# Patient Record
Sex: FEMALE | Race: WHITE | NOT HISPANIC OR LATINO | URBAN - METROPOLITAN AREA
[De-identification: names, ages, dates, MRNs, and addresses within clinical notes are randomized per-mention and may not be internally consistent; named-entity substitution may affect disease eponyms.]

---

## 2022-09-22 ENCOUNTER — INPATIENT (INPATIENT)
Facility: HOSPITAL | Age: 72
LOS: 1 days | Discharge: HOME | DRG: 287 | End: 2022-09-24
Attending: INTERNAL MEDICINE | Admitting: INTERNAL MEDICINE
Payer: MEDICARE

## 2022-09-22 VITALS — HEART RATE: 87 BPM | TEMPERATURE: 98 F

## 2022-09-22 LAB
ALBUMIN SERPL ELPH-MCNC: 4.1 G/DL — SIGNIFICANT CHANGE UP (ref 3.3–5)
ALP SERPL-CCNC: 115 U/L — SIGNIFICANT CHANGE UP (ref 40–120)
ALT FLD-CCNC: 12 U/L — SIGNIFICANT CHANGE UP (ref 10–45)
ANION GAP SERPL CALC-SCNC: 13 MMOL/L — SIGNIFICANT CHANGE UP (ref 5–17)
AST SERPL-CCNC: 22 U/L — SIGNIFICANT CHANGE UP (ref 10–40)
BASOPHILS # BLD AUTO: 0.05 K/UL — SIGNIFICANT CHANGE UP (ref 0–0.2)
BASOPHILS NFR BLD AUTO: 0.6 % — SIGNIFICANT CHANGE UP (ref 0–2)
BILIRUB SERPL-MCNC: 0.4 MG/DL — SIGNIFICANT CHANGE UP (ref 0.2–1.2)
BUN SERPL-MCNC: 12 MG/DL — SIGNIFICANT CHANGE UP (ref 7–23)
CALCIUM SERPL-MCNC: 9.5 MG/DL — SIGNIFICANT CHANGE UP (ref 8.4–10.5)
CHLORIDE SERPL-SCNC: 104 MMOL/L — SIGNIFICANT CHANGE UP (ref 96–108)
CK MB CFR SERPL CALC: 2.1 NG/ML — SIGNIFICANT CHANGE UP (ref 0–6.7)
CK SERPL-CCNC: 44 U/L — SIGNIFICANT CHANGE UP (ref 25–170)
CO2 SERPL-SCNC: 25 MMOL/L — SIGNIFICANT CHANGE UP (ref 22–31)
CREAT SERPL-MCNC: 0.78 MG/DL — SIGNIFICANT CHANGE UP (ref 0.5–1.3)
EGFR: 81 ML/MIN/1.73M2 — SIGNIFICANT CHANGE UP
EOSINOPHIL # BLD AUTO: 0.08 K/UL — SIGNIFICANT CHANGE UP (ref 0–0.5)
EOSINOPHIL NFR BLD AUTO: 0.9 % — SIGNIFICANT CHANGE UP (ref 0–6)
GLUCOSE SERPL-MCNC: 113 MG/DL — HIGH (ref 70–99)
HCT VFR BLD CALC: 40.6 % — SIGNIFICANT CHANGE UP (ref 34.5–45)
HGB BLD-MCNC: 13.7 G/DL — SIGNIFICANT CHANGE UP (ref 11.5–15.5)
IMM GRANULOCYTES NFR BLD AUTO: 0.3 % — SIGNIFICANT CHANGE UP (ref 0–0.9)
LYMPHOCYTES # BLD AUTO: 2.51 K/UL — SIGNIFICANT CHANGE UP (ref 1–3.3)
LYMPHOCYTES # BLD AUTO: 29 % — SIGNIFICANT CHANGE UP (ref 13–44)
MCHC RBC-ENTMCNC: 31.4 PG — SIGNIFICANT CHANGE UP (ref 27–34)
MCHC RBC-ENTMCNC: 33.7 GM/DL — SIGNIFICANT CHANGE UP (ref 32–36)
MCV RBC AUTO: 93.1 FL — SIGNIFICANT CHANGE UP (ref 80–100)
MONOCYTES # BLD AUTO: 0.61 K/UL — SIGNIFICANT CHANGE UP (ref 0–0.9)
MONOCYTES NFR BLD AUTO: 7 % — SIGNIFICANT CHANGE UP (ref 2–14)
NEUTROPHILS # BLD AUTO: 5.38 K/UL — SIGNIFICANT CHANGE UP (ref 1.8–7.4)
NEUTROPHILS NFR BLD AUTO: 62.2 % — SIGNIFICANT CHANGE UP (ref 43–77)
NRBC # BLD: 0 /100 WBCS — SIGNIFICANT CHANGE UP (ref 0–0)
NT-PROBNP SERPL-SCNC: 1711 PG/ML — HIGH (ref 0–300)
PLATELET # BLD AUTO: 276 K/UL — SIGNIFICANT CHANGE UP (ref 150–400)
POTASSIUM SERPL-MCNC: 4.2 MMOL/L — SIGNIFICANT CHANGE UP (ref 3.5–5.3)
POTASSIUM SERPL-SCNC: 4.2 MMOL/L — SIGNIFICANT CHANGE UP (ref 3.5–5.3)
PROT SERPL-MCNC: 6.9 G/DL — SIGNIFICANT CHANGE UP (ref 6–8.3)
RBC # BLD: 4.36 M/UL — SIGNIFICANT CHANGE UP (ref 3.8–5.2)
RBC # FLD: 13.2 % — SIGNIFICANT CHANGE UP (ref 10.3–14.5)
SARS-COV-2 RNA SPEC QL NAA+PROBE: NEGATIVE — SIGNIFICANT CHANGE UP
SODIUM SERPL-SCNC: 142 MMOL/L — SIGNIFICANT CHANGE UP (ref 135–145)
TROPONIN T SERPL-MCNC: 0.03 NG/ML — HIGH (ref 0–0.01)
WBC # BLD: 8.66 K/UL — SIGNIFICANT CHANGE UP (ref 3.8–10.5)
WBC # FLD AUTO: 8.66 K/UL — SIGNIFICANT CHANGE UP (ref 3.8–10.5)

## 2022-09-22 PROCEDURE — 71045 X-RAY EXAM CHEST 1 VIEW: CPT | Mod: 26

## 2022-09-22 PROCEDURE — 93010 ELECTROCARDIOGRAM REPORT: CPT

## 2022-09-22 PROCEDURE — 99291 CRITICAL CARE FIRST HOUR: CPT

## 2022-09-22 RX ORDER — TICAGRELOR 90 MG/1
180 TABLET ORAL ONCE
Refills: 0 | Status: COMPLETED | OUTPATIENT
Start: 2022-09-22 | End: 2022-09-22

## 2022-09-22 RX ORDER — ASPIRIN/CALCIUM CARB/MAGNESIUM 324 MG
1 TABLET ORAL
Qty: 0 | Refills: 0 | DISCHARGE

## 2022-09-22 RX ORDER — ALLOPURINOL 300 MG
150 TABLET ORAL
Qty: 0 | Refills: 0 | DISCHARGE

## 2022-09-22 RX ORDER — ATORVASTATIN CALCIUM 80 MG/1
80 TABLET, FILM COATED ORAL ONCE
Refills: 0 | Status: COMPLETED | OUTPATIENT
Start: 2022-09-22 | End: 2022-09-22

## 2022-09-22 RX ADMIN — TICAGRELOR 180 MILLIGRAM(S): 90 TABLET ORAL at 21:08

## 2022-09-22 RX ADMIN — ATORVASTATIN CALCIUM 80 MILLIGRAM(S): 80 TABLET, FILM COATED ORAL at 21:08

## 2022-09-22 NOTE — ED PROVIDER NOTE - OBJECTIVE STATEMENT
73 yo female with a hx of HTN, TBI w residual vestibular syndrome, cataracts sent to the ED by her PCP for an abnormal stress test. The patient had a normal echo and EKG on Friday according to her- was getting it done as screening. Two days ago, she developed dyspnea on exertion and burning upper b/l anterior chest pain radiating to her neck. She went to her PMD/cardiologist today who performed a stress test. It was positive due to dyspnea and abnormal tracing. Her PCP called EMS and gave her 325mg aspirin. Pt currently denies active chest pain or SOB. Denies fevers, chills, URI symptoms, leg swelling/pain.

## 2022-09-22 NOTE — H&P ADULT - PROBLEM SELECTOR PLAN 2
pt w/ Hx of UTI symptoms in the setting of hyperurecemia in urine, improved on allopurinol  -continue allopurinol 150mg qd pt w/ Hx of UTI symptoms in the setting of hyperurecemia in urine, improved on allopurinol  -continue allopurinol 150mg qd    Dispo pending cath

## 2022-09-22 NOTE — ED PROVIDER NOTE - CLINICAL SUMMARY MEDICAL DECISION MAKING FREE TEXT BOX
73 yo female with a hx of HTN, TBI w residual vestibular syndrome, cataracts sent to the ED by her PCP for an abnormal stress test. No active chest pain. S/p ASA. EKG with KAZ in aVL with ST depression in II, III, aVF, V3-6. ACS work up. No clinical pneumonia or concern for PE at this time.

## 2022-09-22 NOTE — H&P ADULT - PROBLEM SELECTOR PLAN 1
currently w/ band like CP  - s/p ASA 81x 4 in Dr. Woods office, loaded with Brilinta 180mg in ED, Held AM dose, would consider switching to plavix for reduced bleeding risk, patient has vestibular dysfunction/vertigo w/ unsteady gait and risk for falls,   - continue Atorvastatin 80mg and propranolol currently w/ band like CP  - s/p ASA 81x 4 in Dr. Woods office, loaded with Brilinta 180mg in ED, Held AM dose, would consider switching to plavix for reduced bleeding risk, patient has vestibular dysfunction/vertigo w/ unsteady gait and risk for falls,   - continue Atorvastatin 80mg and propranolol switched to metoprolol currently w/ band like CP  - Trop 0.03-> f/u 1 AM trop  - EKG:   - s/p ASA 81x 4 in Dr. Woods office, loaded with Brilinta 180mg in ED, Held AM dose, would consider switching to Plavix for reduced bleeding risk, patient has vestibular dysfunction/vertigo w/ unsteady gait and risk for falls,   - continue Atorvastatin 80mg and propranolol switched to metoprolol 25mg BID  - NPO for cardiac cath   - obtain collateral from Dr. Woods office- Echo, stress test currently w/ band like CP  - Trop 0.03-> f/u 1 AM trop  - EKG: NSR TWI I, AVL V1-V2  - s/p ASA 81x 4 in Dr. Woods office, loaded with Brilinta 180mg in ED, Held AM dose, would consider switching to Plavix for reduced bleeding risk, patient has vestibular dysfunction/vertigo w/ unsteady gait and risk for falls,   - continue Atorvastatin 80mg and propranolol switched to metoprolol 25mg BID  - NPO for cardiac cath   - obtain collateral from Dr. Woods office- Echo, stress test

## 2022-09-22 NOTE — ED ADULT NURSE NOTE - OBJECTIVE STATEMENT
Pt A&Ox4, speaking in clear/full sentences, no acute distress, vital signs stable. PT presents to the ED for abnormal stress test. Pt reports she recently switched cardiologists and the new cardiologist wanted to do a stress test. Per EMS, pt HR during stress test went to 160s. PT HR on arrival was 92. 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 PT reports shakiness at this time. PT reports she has "issues with her bs" but pt denies hx of diabetes. BS WNL. Pt denies SOB/CP at this time.

## 2022-09-22 NOTE — H&P ADULT - HISTORY OF PRESENT ILLNESS
73 y/o female with PMHx who presented to [facility] ED with chest pain after having abnormal NST with Dr. salinas      @ [facility] ED vital signs 155/85mmHg, HR 92bpm, RR 18/min, 98% on RA. 97.9F  Labs significant for:  trop 0.03, CK/CKMB normal, BNP 1711    EKG:  CXR: unremarkable    Patient now admitted to cardiac Tele for r/out acs with plan for ischmeic work up with cardiac cath  73 y/o female with PMHx mitral valve prolapse, visual vestibular mismatch w/ vertigo symptoms, palpitations, hyperuremia on allopurinol who presented to [facility] ED with chest pain after having abnormal NST with Dr. Woods. Patient went for evaluation with Dr. Woods after being told by ophthalmologist that she had signs of hypertensive retinopathy. Patient also noted over the last week chest had two episodes of chest burning radiating to neck and arm, with also band like tightness and associated with diaphoresis, both episodes occurred with exertion but resolved with Pepto-Bismol. Patient had ekg and echo with Dr. Woods that were normal per the patient. Patient had exercise stress test today that had VT as reported by Dr. Woods and presyncope. Patient reported she felt lightheaded and short of breath@ [facility] ED vital signs 155/85mmHg, HR 92bpm, RR 18/min, 98% on RA. 97.9F Labs significant for:  trop 0.03, CK/CKMB normal, BNP 1711 EKG:CXR: unremarkable. Patient received 4 baby aspirin prior to arrival, Brilinta [id number], and atorvastatin 80mg     Patient now admitted to cardiac Tele for r/out acs with plan for ischemic work up with cardiac cath  73 y/o female with PMHx mitral valve prolapse, visual vestibular mismatch w/ vertigo symptoms, palpitations, hyperuremia on allopurinol who presented to [facility] ED with chest pain after having abnormal NST with Dr. Woods. Patient went for evaluation with Dr. Woods after being told by ophthalmologist that she had signs of hypertensive retinopathy. Patient also noted over the last week chest had two episodes of chest burning radiating to neck and arm, with also band like tightness and associated with diaphoresis, both episodes occurred with exertion but resolved with Pepto-Bismol. Patient had ekg and echo with Dr. Woods that were normal per the patient. Patient had exercise stress test today that had VT as reported by Dr. Wodos and presyncope. Patient reported she felt lightheaded and short of breath@ [facility] ED vital signs 155/85mmHg, HR 92bpm, RR 18/min, 98% on RA. 97.9F Labs significant for:  trop 0.03, CK/CKMB normal, BNP 1711 EKG: NSR TWI I, AVL V1-V2 CXR: unremarkable. Patient received 4 baby aspirin prior to arrival, Brilinta [id number], and atorvastatin 80mg     Patient now admitted to cardiac Tele for r/out acs with plan for ischemic work up with cardiac cath

## 2022-09-22 NOTE — H&P ADULT - NSHPSOCIALHISTORY_GEN_ALL_CORE
4 etoh drinks per week, never a smoker, denies recreational drug use  lives alone private home with 3 dogs   former  at [employer]

## 2022-09-22 NOTE — H&P ADULT - NS ATTEND AMEND GEN_ALL_CORE FT
Initial attending contact date 9.23.22 on morning bedside rounds. See attending addendum to HPI here for initial attending contact documentation.   See PA note written above, for details. I reviewed the PA documentation.  I have personally seen and examined this patient today. I reviewed vitals, labs, medications, cardiac studies and additional imaging.  I agree with the PA's findings and plans as written above with the following additions/amendments:  72F with hx mitral valve prolapse, presented abnormal stress test with near syncopal event during stress. In the ED, VSS. Ecg: NSR with sub mm ST depressions in inferolateral leads. Trop 0.03. Normal ck, ckmb. Per outpatient cardiologist Dr Woods patient to be admitted for [facility] ischemic evaluation  ROS: Patient denies cardiopulmonary symptoms. The patient specifically denies CP, SOB, CARDENAS, LH, dizziness, palpitations.  Physical Exam notable for: elderly patient laying in bed in NAD, flat neck veins, RRR, no MGR detected, clear lungs, overly nourished abdomen, no fluid wave detected, no pretibial pitting edema, no ankle edema, skin WWP, A&Ox3, independently ambulatory with no assistance  Plan for:  NPO for [facility] 9/23 PM with Dr Abi Cabrales loaded, high co pay cost - so may need to transition to Plavix if PCI required  DAPT with ASA/ Ticagrelor pending cath and findings  High intensity statin Atorva 80mg qHS  Metoprolol 25 BID  PPI for GI protection  Obtain HbA1c, TFTs, and FLP  Outpatient TTE and stress done with Dr Woods will not repeat imaging here   Disposition pending [facility] results, patient anticipated to go home 9/24 early AM (patient with concern about care for pets in the home)  Magaly Bonner M.D.  Cardiology Attending

## 2022-09-22 NOTE — ED ADULT TRIAGE NOTE - CHIEF COMPLAINT QUOTE
Pt coming from cardiology office today after abnormal stress test. Pt reports chest pain radiating to neck on Tuesday that resolved. Pt reports intermittent dizziness at baseline d/t hx of vesticular mismatch. Pt given ASA 324mg. Denies sob, n/v.

## 2022-09-22 NOTE — H&P ADULT - ASSESSMENT
71 y/o female with PMHx mitral valve prolapse, visual vestibular mismatch w/ vertigo symptoms, palpitations, hyperuremia on allopurinol who presented to [facility] ED with chest pain after having abnormal NST with Dr. Woods. Patient has had 2 episodes of exertional angina in the last week Patient now admitted to cardiac Tele for r/out acs with plan for ischemic work up with cardiac cath

## 2022-09-22 NOTE — ED PROVIDER NOTE - PROGRESS NOTE DETAILS
+ve troponin. Repeat EKG with resolution of KAZ in aVL. Discussed with interventionalist Dr. Snyder- will cath the patient in the morning. Loaded patient with brilinta. Admit to cardio.

## 2022-09-23 ENCOUNTER — TRANSCRIPTION ENCOUNTER (OUTPATIENT)
Age: 72
End: 2022-09-23

## 2022-09-23 DIAGNOSIS — R07.9 CHEST PAIN, UNSPECIFIED: ICD-10-CM

## 2022-09-23 DIAGNOSIS — Z90.49 ACQUIRED ABSENCE OF OTHER SPECIFIED PARTS OF DIGESTIVE TRACT: Chronic | ICD-10-CM

## 2022-09-23 DIAGNOSIS — H26.9 UNSPECIFIED CATARACT: Chronic | ICD-10-CM

## 2022-09-23 DIAGNOSIS — E79.0 HYPERURICEMIA WITHOUT SIGNS OF INFLAMMATORY ARTHRITIS AND TOPHACEOUS DISEASE: ICD-10-CM

## 2022-09-23 DIAGNOSIS — Z90.721 ACQUIRED ABSENCE OF OVARIES, UNILATERAL: Chronic | ICD-10-CM

## 2022-09-23 LAB
ALBUMIN SERPL ELPH-MCNC: 4.1 G/DL — SIGNIFICANT CHANGE UP (ref 3.3–5)
ALP SERPL-CCNC: 114 U/L — SIGNIFICANT CHANGE UP (ref 40–120)
ALT FLD-CCNC: 14 U/L — SIGNIFICANT CHANGE UP (ref 10–45)
ANION GAP SERPL CALC-SCNC: 16 MMOL/L — SIGNIFICANT CHANGE UP (ref 5–17)
APPEARANCE UR: CLEAR — SIGNIFICANT CHANGE UP
APTT BLD: 28.1 SEC — SIGNIFICANT CHANGE UP (ref 27.5–35.5)
AST SERPL-CCNC: 26 U/L — SIGNIFICANT CHANGE UP (ref 10–40)
BACTERIA # UR AUTO: PRESENT /HPF
BILIRUB SERPL-MCNC: 0.7 MG/DL — SIGNIFICANT CHANGE UP (ref 0.2–1.2)
BILIRUB UR-MCNC: NEGATIVE — SIGNIFICANT CHANGE UP
BUN SERPL-MCNC: 11 MG/DL — SIGNIFICANT CHANGE UP (ref 7–23)
CALCIUM SERPL-MCNC: 9.1 MG/DL — SIGNIFICANT CHANGE UP (ref 8.4–10.5)
CHLORIDE SERPL-SCNC: 107 MMOL/L — SIGNIFICANT CHANGE UP (ref 96–108)
CHOLEST SERPL-MCNC: 179 MG/DL — SIGNIFICANT CHANGE UP
CK MB CFR SERPL CALC: 2.3 NG/ML — SIGNIFICANT CHANGE UP (ref 0–6.7)
CK SERPL-CCNC: 47 U/L — SIGNIFICANT CHANGE UP (ref 25–170)
CO2 SERPL-SCNC: 21 MMOL/L — LOW (ref 22–31)
COLOR SPEC: YELLOW — SIGNIFICANT CHANGE UP
CREAT SERPL-MCNC: 0.71 MG/DL — SIGNIFICANT CHANGE UP (ref 0.5–1.3)
DIFF PNL FLD: NEGATIVE — SIGNIFICANT CHANGE UP
EGFR: 90 ML/MIN/1.73M2 — SIGNIFICANT CHANGE UP
EPI CELLS # UR: SIGNIFICANT CHANGE UP /HPF (ref 0–5)
GLUCOSE SERPL-MCNC: 122 MG/DL — HIGH (ref 70–99)
GLUCOSE UR QL: NEGATIVE — SIGNIFICANT CHANGE UP
HCT VFR BLD CALC: 39.7 % — SIGNIFICANT CHANGE UP (ref 34.5–45)
HCV AB S/CO SERPL IA: 0.03 S/CO — SIGNIFICANT CHANGE UP
HCV AB SERPL-IMP: SIGNIFICANT CHANGE UP
HDLC SERPL-MCNC: 62 MG/DL — SIGNIFICANT CHANGE UP
HGB BLD-MCNC: 13.3 G/DL — SIGNIFICANT CHANGE UP (ref 11.5–15.5)
INR BLD: 1.02 — SIGNIFICANT CHANGE UP (ref 0.88–1.16)
KETONES UR-MCNC: NEGATIVE — SIGNIFICANT CHANGE UP
LEUKOCYTE ESTERASE UR-ACNC: ABNORMAL
LIPID PNL WITH DIRECT LDL SERPL: 95 MG/DL — SIGNIFICANT CHANGE UP
MAGNESIUM SERPL-MCNC: 2.1 MG/DL — SIGNIFICANT CHANGE UP (ref 1.6–2.6)
MCHC RBC-ENTMCNC: 31.2 PG — SIGNIFICANT CHANGE UP (ref 27–34)
MCHC RBC-ENTMCNC: 33.5 GM/DL — SIGNIFICANT CHANGE UP (ref 32–36)
MCV RBC AUTO: 93.2 FL — SIGNIFICANT CHANGE UP (ref 80–100)
NITRITE UR-MCNC: NEGATIVE — SIGNIFICANT CHANGE UP
NON HDL CHOLESTEROL: 117 MG/DL — SIGNIFICANT CHANGE UP
NRBC # BLD: 0 /100 WBCS — SIGNIFICANT CHANGE UP (ref 0–0)
PH UR: 7.5 — SIGNIFICANT CHANGE UP (ref 5–8)
PLATELET # BLD AUTO: 279 K/UL — SIGNIFICANT CHANGE UP (ref 150–400)
POTASSIUM SERPL-MCNC: 3.9 MMOL/L — SIGNIFICANT CHANGE UP (ref 3.5–5.3)
POTASSIUM SERPL-SCNC: 3.9 MMOL/L — SIGNIFICANT CHANGE UP (ref 3.5–5.3)
PROT SERPL-MCNC: 6.7 G/DL — SIGNIFICANT CHANGE UP (ref 6–8.3)
PROT UR-MCNC: NEGATIVE MG/DL — SIGNIFICANT CHANGE UP
PROTHROM AB SERPL-ACNC: 12.1 SEC — SIGNIFICANT CHANGE UP (ref 10.5–13.4)
RBC # BLD: 4.26 M/UL — SIGNIFICANT CHANGE UP (ref 3.8–5.2)
RBC # FLD: 13.4 % — SIGNIFICANT CHANGE UP (ref 10.3–14.5)
RBC CASTS # UR COMP ASSIST: < 5 /HPF — SIGNIFICANT CHANGE UP
SODIUM SERPL-SCNC: 144 MMOL/L — SIGNIFICANT CHANGE UP (ref 135–145)
SP GR SPEC: 1.01 — SIGNIFICANT CHANGE UP (ref 1–1.03)
T4 AB SER-ACNC: 9.84 UG/DL — SIGNIFICANT CHANGE UP (ref 4.5–11.7)
TRIGL SERPL-MCNC: 112 MG/DL — SIGNIFICANT CHANGE UP
TROPONIN T SERPL-MCNC: 0.02 NG/ML — HIGH (ref 0–0.01)
TROPONIN T SERPL-MCNC: 0.03 NG/ML — HIGH (ref 0–0.01)
TROPONIN T SERPL-MCNC: 0.03 NG/ML — HIGH (ref 0–0.01)
TSH SERPL-MCNC: 3.96 UIU/ML — SIGNIFICANT CHANGE UP (ref 0.27–4.2)
UROBILINOGEN FLD QL: 0.2 E.U./DL — SIGNIFICANT CHANGE UP
WBC # BLD: 8.7 K/UL — SIGNIFICANT CHANGE UP (ref 3.8–10.5)
WBC # FLD AUTO: 8.7 K/UL — SIGNIFICANT CHANGE UP (ref 3.8–10.5)
WBC UR QL: ABNORMAL /HPF

## 2022-09-23 PROCEDURE — 93458 L HRT ARTERY/VENTRICLE ANGIO: CPT | Mod: 26

## 2022-09-23 PROCEDURE — 99152 MOD SED SAME PHYS/QHP 5/>YRS: CPT

## 2022-09-23 RX ORDER — TICAGRELOR 90 MG/1
1 TABLET ORAL
Qty: 60 | Refills: 0
Start: 2022-09-23 | End: 2022-10-22

## 2022-09-23 RX ORDER — NITROGLYCERIN 6.5 MG
0.5 CAPSULE, EXTENDED RELEASE ORAL ONCE
Refills: 0 | Status: COMPLETED | OUTPATIENT
Start: 2022-09-23 | End: 2022-09-23

## 2022-09-23 RX ORDER — TICAGRELOR 90 MG/1
90 TABLET ORAL EVERY 12 HOURS
Refills: 0 | Status: DISCONTINUED | OUTPATIENT
Start: 2022-09-23 | End: 2022-09-23

## 2022-09-23 RX ORDER — ALLOPURINOL 300 MG
150 TABLET ORAL DAILY
Refills: 0 | Status: DISCONTINUED | OUTPATIENT
Start: 2022-09-23 | End: 2022-09-24

## 2022-09-23 RX ORDER — ATORVASTATIN CALCIUM 80 MG/1
80 TABLET, FILM COATED ORAL AT BEDTIME
Refills: 0 | Status: DISCONTINUED | OUTPATIENT
Start: 2022-09-23 | End: 2022-09-24

## 2022-09-23 RX ORDER — SODIUM CHLORIDE 9 MG/ML
500 INJECTION INTRAMUSCULAR; INTRAVENOUS; SUBCUTANEOUS
Refills: 0 | Status: DISCONTINUED | OUTPATIENT
Start: 2022-09-23 | End: 2022-09-24

## 2022-09-23 RX ORDER — ASPIRIN/CALCIUM CARB/MAGNESIUM 324 MG
81 TABLET ORAL DAILY
Refills: 0 | Status: DISCONTINUED | OUTPATIENT
Start: 2022-09-23 | End: 2022-09-24

## 2022-09-23 RX ORDER — PANTOPRAZOLE SODIUM 20 MG/1
40 TABLET, DELAYED RELEASE ORAL
Refills: 0 | Status: DISCONTINUED | OUTPATIENT
Start: 2022-09-23 | End: 2022-09-24

## 2022-09-23 RX ORDER — SODIUM CHLORIDE 9 MG/ML
1000 INJECTION INTRAMUSCULAR; INTRAVENOUS; SUBCUTANEOUS
Refills: 0 | Status: DISCONTINUED | OUTPATIENT
Start: 2022-09-23 | End: 2022-09-23

## 2022-09-23 RX ORDER — INFLUENZA VIRUS VACCINE 15; 15; 15; 15 UG/.5ML; UG/.5ML; UG/.5ML; UG/.5ML
0.7 SUSPENSION INTRAMUSCULAR ONCE
Refills: 0 | Status: DISCONTINUED | OUTPATIENT
Start: 2022-09-23 | End: 2022-09-24

## 2022-09-23 RX ORDER — METOPROLOL TARTRATE 50 MG
25 TABLET ORAL
Refills: 0 | Status: DISCONTINUED | OUTPATIENT
Start: 2022-09-23 | End: 2022-09-24

## 2022-09-23 RX ADMIN — Medication 0.5 INCH(S): at 01:16

## 2022-09-23 RX ADMIN — Medication 150 MILLIGRAM(S): at 05:43

## 2022-09-23 RX ADMIN — SODIUM CHLORIDE 200 MILLILITER(S): 9 INJECTION INTRAMUSCULAR; INTRAVENOUS; SUBCUTANEOUS at 19:51

## 2022-09-23 RX ADMIN — Medication 0.5 INCH(S): at 14:22

## 2022-09-23 RX ADMIN — TICAGRELOR 90 MILLIGRAM(S): 90 TABLET ORAL at 10:59

## 2022-09-23 RX ADMIN — Medication 150 MILLIGRAM(S): at 00:53

## 2022-09-23 RX ADMIN — SODIUM CHLORIDE 75 MILLILITER(S): 9 INJECTION INTRAMUSCULAR; INTRAVENOUS; SUBCUTANEOUS at 00:51

## 2022-09-23 RX ADMIN — Medication 25 MILLIGRAM(S): at 00:51

## 2022-09-23 RX ADMIN — PANTOPRAZOLE SODIUM 40 MILLIGRAM(S): 20 TABLET, DELAYED RELEASE ORAL at 10:59

## 2022-09-23 RX ADMIN — Medication 25 MILLIGRAM(S): at 17:26

## 2022-09-23 RX ADMIN — Medication 81 MILLIGRAM(S): at 05:43

## 2022-09-23 RX ADMIN — ATORVASTATIN CALCIUM 80 MILLIGRAM(S): 80 TABLET, FILM COATED ORAL at 21:28

## 2022-09-23 NOTE — DISCHARGE NOTE PROVIDER - PROVIDER TOKENS
PROVIDER:[TOKEN:[[id number]],FOLLOWUP:[1 week],ESTABLISHEDPATIENT:[T]] PROVIDER:[TOKEN:[[id number]],SCHEDULEDAPPT:[09/27/2022],SCHEDULEDAPPTTIME:[12:30 PM],ESTABLISHEDPATIENT:[T]]

## 2022-09-23 NOTE — DISCHARGE NOTE PROVIDER - HOSPITAL COURSE
Hospital Course Summary:    Pt is s/p Cardiac Cath on 9/23 revealing xxx    SUBJECTIVE ASSESSMENT:    Pt. seen and examined at bedside this morning, without complaints and is out of bed ambulating.  Access site (Add Location) stable without bleeding or hematoma, distal pulses intact. Vital signs stable; EKG, labs and telemetry reviewed. Home medication regimen reviewed with . Patient is to continue Home Cardiac medications propranolol and all other home medications.  Prescriptions for (FILL MEDICATIONS IN HERE Aspirin and Plavix/Brilinta/Effient) e-prescribed to patient’s preferred pharmacy.  Pt. is stable for discharge as per  ______  and will f/u with Dr. Woods 9/27 12:30pm. Patient has been given appropriate discharge instructions including medication regimen, access site management and follow up.      PHYSICAL EXAM:  General:   Neurological:  Cardiovascular:  Respiratory:  Gastrointestinal:  Extremities:  Vascular:  Cath Access Sites:      Dx: Heart Failure, Unstable Angina/ACS/NSTEMI/STEMI this Admit or History of Heart Failure, : YES/NO            If Yes to CHF/ACS/AMI: 7 day Follow-Up Appointment Made: Yes/No, Yes: Date/Time; IF No, why not?           Cardiac Rehab (STEMI/NSTEMI/ACS/Unstable Angina/CHF/Post PCI):            *Education on benefits of Cardiac Rehab provided to patient: Yes         *Referral and Prescription Given for Cardiac Rehab: Yes/No.  If No, Why Not?  (see accepted reasons below)         *Pt given list of locations & instructed to contact their insurance company to review list of participating providers. Yes         *Pt instructed to bring Cardiac Rehab prescription with them to Cardiology Follow up appointment for assistance with enrollment: Yes    (Reasons for No Cardiac Rehab Referral Rx - must document 1 or more options):                Patient Refused            Medical Reason: ex needs Home Care, Home PT            Patient lacks medical coverage for Cardiac Rehab            Pt discharged to Nursing Care/[facility]/Long term Care Facility            Patient Lacks Transportation or no cardiac rehab within 60 minutes driving range            Patient already participates in Cardiac Rehab            Other: (provide details) ex: Pt discharged to Hospice; prescription printer not working & pt was instructed to obtain Rx from outpt Cardiologist.    AMI: Beta Blocker Prescribed: Yes/No. If no, Why not?            ACE-I/ARB Prescribed: Yes/No. If no, Why not? ex: Entresto prescribed, Not indicated at this time, worsening renal function    Statin Prescribed (STEMI/NSTEMI/ACS/UA &/OR Post PCI this admission):  Yes/No; If No, No Statin Prescribed due to______  DAPT: Prescriptions for Aspirin/Plavix/Brilinta/Effient e-prescribed to patient's pharmacy: Yes/No__.                *No Aspirin/Plavix/Brilinta/Effient prescribed due to ___.     Hospital Course Summary:    Pt is s/p Cardiac Cath on 9/23 revealing s/p cardiac catheterization 9/23/22: diagnostic, diffuse MLI, mLAD bridge, EDP 7, EF 65% with ? apical aneurysm.  Baseline ECHO normal LV function, no regional wall abn. Stress test revealed duke treadmill score -12, ST-T wave abnormalities, 1.5-2 mm horizontal depression in lateral leads, anterior wall hypokinesis, VT. mets 3.8, exercise duration of 1min 35 seconds.     SUBJECTIVE ASSESSMENT:    Pt. seen and examined at bedside this morning, without complaints and is out of bed ambulating.  Access site R wrist stable without bleeding or hematoma, distal pulses intact. Vital signs stable; EKG, labs and telemetry reviewed. Home medication regimen reviewed with . Patient is to continue Home Cardiac medications propranolol and all other home medications.  Prescriptions for (FILL MEDICATIONS IN HERE Aspirin and Plavix/Brilinta/Effient) e-prescribed to patient’s preferred pharmacy.  Pt. is stable for discharge as per Dr. Bryan and will f/u with Dr. Woods 9/27 12:30pm. Patient has been given appropriate discharge instructions including medication regimen, access site management and follow up.    #GERD. Pt reports epigastric pain worsening with eating and lying flat, relieved by pantoprazole  Plan:   - discharge with pantoprazole 40mg   - f/u with PCP    #asymptomatic bacturia. Pt with hx of hyperuricemia, on allopurinol. Pt denies dysuria, polyuria, increased urinary frequency.   - f/u PCP    PHYSICAL EXAM:  General: AAO x3   Neurological:  Cardiovascular: +S1, S2, no m/r/g  Respiratory: CTAB  Gastrointestinal: NT, ND   Extremities: no edema  Vascular:   Cath Access Sites: R radial A, clean bandage, intact distal pulses Hospital Course Summary:    Pt is s/p Cardiac Cath on 9/23 revealing s/p cardiac catheterization 9/23/22: diagnostic, diffuse MLI, mLAD bridge, EDP 7, EF 65% with ? apical aneurysm.  Baseline ECHO normal LV function, no regional wall abn. Stress test revealed duke treadmill score -12, ST-T wave abnormalities, 1.5-2 mm horizontal depression in lateral leads, anterior wall hypokinesis, VT. mets 3.8, exercise duration of 1min 35 seconds.     SUBJECTIVE ASSESSMENT:    Pt. seen and examined at bedside this morning, without complaints and is out of bed ambulating.  Access site R wrist stable without bleeding or hematoma, distal pulses intact. Vital signs stable; EKG, labs and telemetry reviewed. Home medication regimen reviewed with . Patient is to continue Home Cardiac medications propranolol and all other home medications.  Prescriptions for (FILL MEDICATIONS IN HERE Aspirin and Plavix/Brilinta/Effient) e-prescribed to patient’s preferred pharmacy.  Pt. is stable for discharge as per Dr. Bryan and will f/u with Dr. Woods 9/27 12:30pm. Patient has been given appropriate discharge instructions including medication regimen, access site management and follow up.    #GERD. Pt reports epigastric pain worsening with eating and lying flat, relieved by pantoprazole  Plan:   - discharge with pantoprazole 40mg   - f/u with PCP    #UTI. Pt with hx of hyperuricemia, on allopurinol. Pt reporting bladder fullness and dysuria. Afebrile  - f/u PCP    PHYSICAL EXAM:  General: AAO x3   Neurological:  Cardiovascular: +S1, S2, no m/r/g  Respiratory: CTAB  Gastrointestinal: NT, ND   Extremities: no edema  Vascular:   Cath Access Sites: R radial A, clean bandage, intact distal pulses Hospital Course Summary:    Pt is s/p Cardiac Cath on 9/23 revealing s/p cardiac catheterization 9/23/22: diagnostic, diffuse MLI, mLAD bridge, EDP 7, EF 65% with ? apical aneurysm.  F/u ECHO with definity contrast showed   ECHO w/ definity contrast   1. Normal left ventricular size and systolic function.   2. Normal right ventricular size and systolic function.   3. No significant valvular disease.   4. Trivial pericardial effusion.   5. No prior echo is available for comparison.    SUBJECTIVE ASSESSMENT:    Pt. seen and examined at bedside this morning, without complaints and is out of bed ambulating.  Access site R wrist stable without bleeding or hematoma, distal pulses intact. Vital signs stable; EKG, labs and telemetry reviewed. Home medication regimen reviewed with . Patient is to continue Home Cardiac medications propranolol and all other home medications.  Prescriptions for (FILL MEDICATIONS IN HERE Aspirin and Plavix/Brilinta/Effient) e-prescribed to patient’s preferred pharmacy.  Pt. is stable for discharge as per Dr. Bryan and will f/u with Dr. Woods 9/27 12:30pm. Patient has been given appropriate discharge instructions including medication regimen, access site management and follow up.      #GERD. Pt reports epigastric pain worsening with eating and lying flat, relieved by pantoprazole  Plan:   - discharge with pantoprazole 40mg   - f/u with PCP    #UTI. Pt with hx of hyperuricemia, on allopurinol. Pt reporting bladder fullness and dysuria  - Prescribed macrobid 100mg BID x 7 days   - f/u PCP    PHYSICAL EXAM:  General: AAO x3   Neurological:  Cardiovascular: +S1, S2, no m/r/g  Respiratory: CTAB  Gastrointestinal: NT, ND   Extremities: no edema  Vascular:   Cath Access Sites: R radial A, clean bandage, intact distal pulses Hospital Course Summary:    Pt is s/p Cardiac Cath on 9/23 revealing s/p cardiac catheterization 9/23/22: diagnostic, diffuse MLI, mLAD bridge, EDP 7, EF 65% with ? apical aneurysm.  F/u ECHO with definity contrast showed   ECHO w/ definity contrast   1. Normal left ventricular size and systolic function.   2. Normal right ventricular size and systolic function.   3. No significant valvular disease.   4. Trivial pericardial effusion.   5. No prior echo is available for comparison.    SUBJECTIVE ASSESSMENT:    Pt. seen and examined at bedside this morning, without complaints and is out of bed ambulating.  Access site R wrist stable without bleeding or hematoma, distal pulses intact. Vital signs stable; EKG, labs and telemetry reviewed. Home medication regimen reviewed with . Patient is to continue Home Cardiac medications propranolol and all other home medications.  Prescriptions for (FILL MEDICATIONS IN HERE Aspirin and Plavix/Brilinta/Effient) e-prescribed to patient’s preferred pharmacy.  Pt. is stable for discharge as per Dr. Bryan and will f/u with Dr. Woods 9/27 12:30pm. Patient has been given appropriate discharge instructions including medication regimen, access site management and follow up.      #GERD. Pt reports epigastric pain worsening with eating and lying flat, relieved by pantoprazole  Plan:   - discharge with pantoprazole 40mg   - f/u with PCP    #UTI. Pt with hx of hyperuricemia, on allopurinol. Pt denies dysuria, polyuria. Urine culture negative   - continue allopurinol  - f/u PCP    PHYSICAL EXAM:  General: AAO x3   Neurological:  Cardiovascular: +S1, S2, no m/r/g  Respiratory: CTAB  Gastrointestinal: NT, ND   Extremities: no edema  Vascular:   Cath Access Sites: R radial A, clean bandage, intact distal pulses Hospital Course Summary:    Pt is s/p Cardiac Cath on 9/23 revealing s/p cardiac catheterization 9/23/22: diagnostic, diffuse MLI, mLAD bridge, EDP 7, EF 65% with ? apical aneurysm.  F/u ECHO with definity contrast showed   ECHO w/ definity contrast   1. Normal left ventricular size and systolic function.   2. Normal right ventricular size and systolic function.   3. No significant valvular disease.   4. Trivial pericardial effusion.   5. No prior echo is available for comparison.    SUBJECTIVE ASSESSMENT:    Pt. seen and examined at bedside this morning, without complaints and is out of bed ambulating.  Access site R wrist stable without bleeding or hematoma, distal pulses intact. Vital signs stable; EKG, labs and telemetry reviewed. Home medication regimen reviewed with . Patient is to continue Home Cardiac medications propranolol and all other home medications.  Prescriptions for (FILL MEDICATIONS IN HERE Aspirin and Plavix/Brilinta/Effient) e-prescribed to patient’s preferred pharmacy.  Pt. is stable for discharge as per Dr. Bryan and will f/u with Dr. Woods 9/27 12:30pm. Patient has been given appropriate discharge instructions including medication regimen, access site management and follow up.      #GERD. Pt reports epigastric pain worsening with eating and lying flat, relieved by pantoprazole  Plan:   - discharge with pantoprazole 40mg   - f/u with PCP    #UTI. Pt with hx of hyperuricemia, on allopurinol. Pt denies dysuria, polyuria. Urine culture negative   - continue allopurinol  - f/u PCP    PHYSICAL EXAM:  General: AAO x3   Neurological: CNII-XII grossly intact, no focal deficits  Cardiovascular: RRR +S1, S2, no m/r/g  Respiratory: CTAB b/l  Gastrointestinal: NT, ND, normal bowel sounds   Extremities: no L/E edema b/l   Vascular: 2+ U/E and L/E pulses b/l  Cath Access Sites: R radial A, clean bandage, intact distal pulses

## 2022-09-23 NOTE — PROGRESS NOTE ADULT - SUBJECTIVE AND OBJECTIVE BOX
Medicine Progress Note (INCOMPLETE)    SUBJECTIVE / OVERNIGHT EVENTS:  O/N events:  Patient was seen and examined at bedside.  Otherwise negative ROS    MEDICATIONS  (STANDING):  allopurinol 150 milliGRAM(s) Oral daily  aspirin enteric coated 81 milliGRAM(s) Oral daily  atorvastatin 80 milliGRAM(s) Oral at bedtime  influenza  Vaccine (HIGH DOSE) 0.7 milliLiter(s) IntraMuscular once  metoprolol tartrate 25 milliGRAM(s) Oral two times a day  pantoprazole    Tablet 40 milliGRAM(s) Oral before breakfast  sodium chloride 0.9%. 1000 milliLiter(s) (75 mL/Hr) IV Continuous <Continuous>  ticagrelor 90 milliGRAM(s) Oral every 12 hours    MEDICATIONS  (PRN):    CAPILLARY BLOOD GLUCOSE      POCT Blood Glucose.: 139 mg/dL (22 Sep 2022 18:17)        OBJECTIVE:  Vital Signs Last 24 Hrs  T(C): 36.7 (23 Sep 2022 05:15), Max: 36.8 (22 Sep 2022 18:12)  T(F): 98 (23 Sep 2022 05:15), Max: 98.3 (22 Sep 2022 18:12)  HR: 70 (23 Sep 2022 05:45) (70 - 92)  BP: 139/65 (23 Sep 2022 05:45) (139/65 - 162/71)  BP(mean): 93 (23 Sep 2022 05:45) (93 - 102)  RR: 16 (23 Sep 2022 05:45) (16 - 18)  SpO2: 97% (23 Sep 2022 05:45) (93% - 98%)    Parameters below as of 23 Sep 2022 05:45  Patient On (Oxygen Delivery Method): room air        PHYSICAL EXAM:  General: AAOx3, NAD  Head: NC/AT; MMM; PERRL; EOMI;  Neck: Supple; no JVD  Respiratory: CTAB; no wheezes/rales/rhonchi  Cardiovascular: Regular rhythm/rate; S1/S2+, no murmurs, rubs gallops   Gastrointestinal: Soft; NTND; bowel sounds normal and present  Extremities: WWP; no edema/cyanosis  Neurological: CNII-XII grossly intact; no obvious focal deficits  Skin: Clean and intact. Good skin turgor. Without open wounds and sores  I&O's Summary    22 Sep 2022 07:01  -  23 Sep 2022 07:00  --------------------------------------------------------  IN: 600 mL / OUT: 0 mL / NET: 600 mL    23 Sep 2022 07:01  -  23 Sep 2022 09:00  --------------------------------------------------------  IN: 150 mL / OUT: 0 mL / NET: 150 mL        LABS:                        13.3   8.70  )-----------( 279      ( 23 Sep 2022 05:30 )             39.7         144  |  107  |  11  ----------------------------<  122<H>  3.9   |  21<L>  |  0.71    Ca    9.1      23 Sep 2022 05:30  Mg     2.1         TPro  6.7  /  Alb  4.1  /  TBili  0.7  /  DBili  x   /  AST  26  /  ALT  14  /  AlkPhos  114        CARDIAC MARKERS ( 23 Sep 2022 05:30 )  x     / 0.03 ng/mL / x     / x     / x      CARDIAC MARKERS ( 23 Sep 2022 01:12 )  x     / 0.03 ng/mL / 47 U/L / x     / 2.3 ng/mL  CARDIAC MARKERS ( 22 Sep 2022 19:41 )  x     / 0.03 ng/mL / 44 U/L / x     / 2.1 ng/mL      Urinalysis Basic - ( 23 Sep 2022 02:44 )    Color: Yellow / Appearance: Clear / S.015 / pH: x  Gluc: x / Ketone: NEGATIVE  / Bili: Negative / Urobili: 0.2 E.U./dL   Blood: x / Protein: NEGATIVE mg/dL / Nitrite: NEGATIVE   Leuk Esterase: Small / RBC: < 5 /HPF / WBC 5-10 /HPF   Sq Epi: x / Non Sq Epi: 0-5 /HPF / Bacteria: Present /HPF        Urinalysis with Rflx Culture (collected 23 Sep 2022 02:44)          RADIOLOGY & ADDITIONAL TESTS:  Imaging from Last 24 Hours: Medicine Progress Note    SUBJECTIVE / OVERNIGHT EVENTS:  O/N events: No acute events.  Patient was seen and examined at bedside. On schedule with Dr Abi moore. Currently reporting   Otherwise negative ROS    MEDICATIONS  (STANDING):  allopurinol 150 milliGRAM(s) Oral daily  aspirin enteric coated 81 milliGRAM(s) Oral daily  atorvastatin 80 milliGRAM(s) Oral at bedtime  influenza  Vaccine (HIGH DOSE) 0.7 milliLiter(s) IntraMuscular once  metoprolol tartrate 25 milliGRAM(s) Oral two times a day  pantoprazole    Tablet 40 milliGRAM(s) Oral before breakfast  sodium chloride 0.9%. 1000 milliLiter(s) (75 mL/Hr) IV Continuous <Continuous>  ticagrelor 90 milliGRAM(s) Oral every 12 hours    MEDICATIONS  (PRN):    CAPILLARY BLOOD GLUCOSE      POCT Blood Glucose.: 139 mg/dL (22 Sep 2022 18:17)        OBJECTIVE:  Vital Signs Last 24 Hrs  T(C): 36.7 (23 Sep 2022 05:15), Max: 36.8 (22 Sep 2022 18:12)  T(F): 98 (23 Sep 2022 05:15), Max: 98.3 (22 Sep 2022 18:12)  HR: 70 (23 Sep 2022 05:45) (70 - 92)  BP: 139/65 (23 Sep 2022 05:45) (139/65 - 162/71)  BP(mean): 93 (23 Sep 2022 05:45) (93 - 102)  RR: 16 (23 Sep 2022 05:45) (16 - 18)  SpO2: 97% (23 Sep 2022 05:45) (93% - 98%)    Parameters below as of 23 Sep 2022 05:45  Patient On (Oxygen Delivery Method): room air        PHYSICAL EXAM:  General: AAOx3, NAD  Head: NC/AT; MMM; PERRL; EOMI;  Neck: Supple; no JVD  Respiratory: CTAB; no wheezes/rales/rhonchi  Cardiovascular: Regular rhythm/rate; S1/S2+, no murmurs, rubs gallops   Gastrointestinal: Soft; NTND; bowel sounds normal and present  Extremities: WWP; no edema/cyanosis  Neurological: CNII-XII grossly intact; no obvious focal deficits  Skin: Clean and intact. Good skin turgor. Without open wounds and sores  I&O's Summary    22 Sep 2022 07:01  -  23 Sep 2022 07:00  --------------------------------------------------------  IN: 600 mL / OUT: 0 mL / NET: 600 mL    23 Sep 2022 07:01  -  23 Sep 2022 09:00  --------------------------------------------------------  IN: 150 mL / OUT: 0 mL / NET: 150 mL        LABS:                        13.3   8.70  )-----------( 279      ( 23 Sep 2022 05:30 )             39.7     09-23    144  |  107  |  11  ----------------------------<  122<H>  3.9   |  21<L>  |  0.71    Ca    9.1      23 Sep 2022 05:30  Mg     2.1         TPro  6.7  /  Alb  4.1  /  TBili  0.7  /  DBili  x   /  AST  26  /  ALT  14  /  AlkPhos  114        CARDIAC MARKERS ( 23 Sep 2022 05:30 )  x     / 0.03 ng/mL / x     / x     / x      CARDIAC MARKERS ( 23 Sep 2022 01:12 )  x     / 0.03 ng/mL / 47 U/L / x     / 2.3 ng/mL  CARDIAC MARKERS ( 22 Sep 2022 19:41 )  x     / 0.03 ng/mL / 44 U/L / x     / 2.1 ng/mL      Urinalysis Basic - ( 23 Sep 2022 02:44 )    Color: Yellow / Appearance: Clear / S.015 / pH: x  Gluc: x / Ketone: NEGATIVE  / Bili: Negative / Urobili: 0.2 E.U./dL   Blood: x / Protein: NEGATIVE mg/dL / Nitrite: NEGATIVE   Leuk Esterase: Small / RBC: < 5 /HPF / WBC 5-10 /HPF   Sq Epi: x / Non Sq Epi: 0-5 /HPF / Bacteria: Present /HPF        Urinalysis with Rflx Culture (collected 23 Sep 2022 02:44)          RADIOLOGY & ADDITIONAL TESTS:  Imaging from Last 24 Hours: Medicine Progress Note    SUBJECTIVE / OVERNIGHT EVENTS:  O/N events: No acute events.  Patient was seen and examined at bedside. On schedule with Dr Abi moore. Currently reporting upper abdominal tightness. Denies dysuria, polyuria, urinary retention.   Otherwise negative ROS    Collateral Dr Lozano office:   ECHO:      MEDICATIONS  (STANDING):  allopurinol 150 milliGRAM(s) Oral daily  aspirin enteric coated 81 milliGRAM(s) Oral daily  atorvastatin 80 milliGRAM(s) Oral at bedtime  influenza  Vaccine (HIGH DOSE) 0.7 milliLiter(s) IntraMuscular once  metoprolol tartrate 25 milliGRAM(s) Oral two times a day  pantoprazole    Tablet 40 milliGRAM(s) Oral before breakfast  sodium chloride 0.9%. 1000 milliLiter(s) (75 mL/Hr) IV Continuous <Continuous>  ticagrelor 90 milliGRAM(s) Oral every 12 hours    MEDICATIONS  (PRN):    CAPILLARY BLOOD GLUCOSE      POCT Blood Glucose.: 139 mg/dL (22 Sep 2022 18:17)        OBJECTIVE:  Vital Signs Last 24 Hrs  T(C): 36.7 (23 Sep 2022 05:15), Max: 36.8 (22 Sep 2022 18:12)  T(F): 98 (23 Sep 2022 05:15), Max: 98.3 (22 Sep 2022 18:12)  HR: 70 (23 Sep 2022 05:45) (70 - 92)  BP: 139/65 (23 Sep 2022 05:45) (139/65 - 162/71)  BP(mean): 93 (23 Sep 2022 05:45) (93 - 102)  RR: 16 (23 Sep 2022 05:45) (16 - 18)  SpO2: 97% (23 Sep 2022 05:45) (93% - 98%)    Parameters below as of 23 Sep 2022 05:45  Patient On (Oxygen Delivery Method): room air        PHYSICAL EXAM:  General: AAOx3, NAD  Head: NC/AT; MMM; PERRL; EOMI;  Neck: Supple; no JVD  Respiratory: CTAB; no wheezes/rales/rhonchi  Cardiovascular: Regular rhythm/rate; S1/S2+, no murmurs, rubs gallops   Gastrointestinal: Soft; NTND; bowel sounds normal and present  Extremities: WWP; no edema/cyanosis  Neurological: CNII-XII grossly intact; no obvious focal deficits  Skin: Clean and intact. Good skin turgor. Without open wounds and sores  I&O's Summary    22 Sep 2022 07:01  -  23 Sep 2022 07:00  --------------------------------------------------------  IN: 600 mL / OUT: 0 mL / NET: 600 mL    23 Sep 2022 07:01  -  23 Sep 2022 09:00  --------------------------------------------------------  IN: 150 mL / OUT: 0 mL / NET: 150 mL        LABS:                        13.3   8.70  )-----------( 279      ( 23 Sep 2022 05:30 )             39.7         144  |  107  |  11  ----------------------------<  122<H>  3.9   |  21<L>  |  0.71    Ca    9.1      23 Sep 2022 05:30  Mg     2.1         TPro  6.7  /  Alb  4.1  /  TBili  0.7  /  DBili  x   /  AST  26  /  ALT  14  /  AlkPhos  114        CARDIAC MARKERS ( 23 Sep 2022 05:30 )  x     / 0.03 ng/mL / x     / x     / x      CARDIAC MARKERS ( 23 Sep 2022 01:12 )  x     / 0.03 ng/mL / 47 U/L / x     / 2.3 ng/mL  CARDIAC MARKERS ( 22 Sep 2022 19:41 )  x     / 0.03 ng/mL / 44 U/L / x     / 2.1 ng/mL      Urinalysis Basic - ( 23 Sep 2022 02:44 )    Color: Yellow / Appearance: Clear / S.015 / pH: x  Gluc: x / Ketone: NEGATIVE  / Bili: Negative / Urobili: 0.2 E.U./dL   Blood: x / Protein: NEGATIVE mg/dL / Nitrite: NEGATIVE   Leuk Esterase: Small / RBC: < 5 /HPF / WBC 5-10 /HPF   Sq Epi: x / Non Sq Epi: 0-5 /HPF / Bacteria: Present /HPF        Urinalysis with Rflx Culture (collected 23 Sep 2022 02:44)          RADIOLOGY & ADDITIONAL TESTS:  Imaging from Last 24 Hours: Cardiac Tele Progress Note    SUBJECTIVE / OVERNIGHT EVENTS:  O/N events: No acute events.  Patient was seen and examined at bedside, resting comfortably. On schedule with Dr Abi moore. Currently reporting upper abdominal tightness. Denies dysuria, polyuria, urinary retention.   Otherwise negative ROS    Collateral Dr Lozano office:   Stress ECHO : Resting LV normal systolic function, No regional wall abnormalities.   Anguiano treadmill score -12, ST-T wave abNs, 1.5-2 mm horizontal depression in V3, V4, V5, V6. Anterior wall hypokinesis, Pt presented in VTach       MEDICATIONS  (STANDING):  allopurinol 150 milliGRAM(s) Oral daily  aspirin enteric coated 81 milliGRAM(s) Oral daily  atorvastatin 80 milliGRAM(s) Oral at bedtime  influenza  Vaccine (HIGH DOSE) 0.7 milliLiter(s) IntraMuscular once  metoprolol tartrate 25 milliGRAM(s) Oral two times a day  pantoprazole    Tablet 40 milliGRAM(s) Oral before breakfast  sodium chloride 0.9%. 1000 milliLiter(s) (75 mL/Hr) IV Continuous <Continuous>  ticagrelor 90 milliGRAM(s) Oral every 12 hours    MEDICATIONS  (PRN):    CAPILLARY BLOOD GLUCOSE      POCT Blood Glucose.: 139 mg/dL (22 Sep 2022 18:17)        OBJECTIVE:  Vital Signs Last 24 Hrs  T(C): 36.7 (23 Sep 2022 05:15), Max: 36.8 (22 Sep 2022 18:12)  T(F): 98 (23 Sep 2022 05:15), Max: 98.3 (22 Sep 2022 18:12)  HR: 70 (23 Sep 2022 05:45) (70 - 92)  BP: 139/65 (23 Sep 2022 05:45) (139/65 - 162/71)  BP(mean): 93 (23 Sep 2022 05:45) (93 - 102)  RR: 16 (23 Sep 2022 05:45) (16 - 18)  SpO2: 97% (23 Sep 2022 05:45) (93% - 98%)    Parameters below as of 23 Sep 2022 05:45  Patient On (Oxygen Delivery Method): room air        PHYSICAL EXAM:  General: AAOx3, NAD  Head: NC/AT; MMM; PERRL; EOMI;  Neck: Supple; no JVD  Respiratory: CTAB; no wheezes/rales/rhonchi  Cardiovascular: Regular rhythm/rate; S1/S2+, no murmurs, rubs gallops   Gastrointestinal: Soft; NTND; bowel sounds normal and present  Extremities: WWP; no edema/cyanosis  Neurological: CNII-XII grossly intact; no obvious focal deficits  Skin: Clean and intact. Good skin turgor. Without open wounds and sores  I&O's Summary    22 Sep 2022 07:01  -  23 Sep 2022 07:00  --------------------------------------------------------  IN: 600 mL / OUT: 0 mL / NET: 600 mL    23 Sep 2022 07:01  -  23 Sep 2022 09:00  --------------------------------------------------------  IN: 150 mL / OUT: 0 mL / NET: 150 mL        LABS:                        13.3   8.70  )-----------( 279      ( 23 Sep 2022 05:30 )             39.7         144  |  107  |  11  ----------------------------<  122<H>  3.9   |  21<L>  |  0.71    Ca    9.1      23 Sep 2022 05:30  Mg     2.1         TPro  6.7  /  Alb  4.1  /  TBili  0.7  /  DBili  x   /  AST  26  /  ALT  14  /  AlkPhos  114        CARDIAC MARKERS ( 23 Sep 2022 05:30 )  x     / 0.03 ng/mL / x     / x     / x      CARDIAC MARKERS ( 23 Sep 2022 01:12 )  x     / 0.03 ng/mL / 47 U/L / x     / 2.3 ng/mL  CARDIAC MARKERS ( 22 Sep 2022 19:41 )  x     / 0.03 ng/mL / 44 U/L / x     / 2.1 ng/mL      Urinalysis Basic - ( 23 Sep 2022 02:44 )    Color: Yellow / Appearance: Clear / S.015 / pH: x  Gluc: x / Ketone: NEGATIVE  / Bili: Negative / Urobili: 0.2 E.U./dL   Blood: x / Protein: NEGATIVE mg/dL / Nitrite: NEGATIVE   Leuk Esterase: Small / RBC: < 5 /HPF / WBC 5-10 /HPF   Sq Epi: x / Non Sq Epi: 0-5 /HPF / Bacteria: Present /HPF        Urinalysis with Rflx Culture (collected 23 Sep 2022 02:44)          RADIOLOGY & ADDITIONAL TESTS:  Imaging from Last 24 Hours:

## 2022-09-23 NOTE — PATIENT PROFILE ADULT - FALL HARM RISK - HARM RISK INTERVENTIONS

## 2022-09-23 NOTE — DISCHARGE NOTE PROVIDER - NSDCCPCAREPLAN_GEN_ALL_CORE_FT
PRINCIPAL DISCHARGE DIAGNOSIS  Diagnosis: Status post left heart catheterization  Assessment and Plan of Treatment: You had a left heart catheterization done that showed no blockages and a possible aneurym or ballooning at the front of your heart.  The followup echocardiogram heart ultrasound showed a normal heart with normal left ventricle function that did not visualize the aneurysm  Your procedure was done through your wrist/groin.  You may shower but Avoid tub baths, hot tubs or swimming for 5 days to prevent infection.  You do not need to keep this area covered. You should wait 3 days before returning to ordinary activities. Do not drive for 2 days. Do not lift more than 5 pounds with affected arm for 3 days or more than 10 pounds if groin access for 5 days.  If you develop any swelling, bleeding, hardening of the skin (hematoma formation), acute pain, numbness/tingling  in your arm/groin please contact your doctor immediately or call our 24/7 line: [phone] or go to nearest Emergency Room. Please return to the hospital/seek immediate medical attention if you have worsening symptoms of chest pain, shortness of breath.   Please fol         PRINCIPAL DISCHARGE DIAGNOSIS  Diagnosis: Status post left heart catheterization  Assessment and Plan of Treatment: You had a left heart catheterization done that showed no blockages and a possible aneurym or ballooning at the front of your heart.  The followup echocardiogram heart ultrasound showed a normal heart with normal left ventricle function that did not visualize the aneurysm  Your procedure was done through your wrist/groin.  You may shower but Avoid tub baths, hot tubs or swimming for 5 days to prevent infection.  You do not need to keep this area covered. You should wait 3 days before returning to ordinary activities. Do not drive for 2 days. Do not lift more than 5 pounds with affected arm for 3 days or more than 10 pounds if groin access for 5 days.  If you develop any swelling, bleeding, hardening of the skin (hematoma formation), acute pain, numbness/tingling  in your arm/groin please contact your doctor immediately or call our 24/7 line: [phone] or go to nearest Emergency Room. Please return to the hospital/seek immediate medical attention if you have worsening symptoms of chest pain, shortness of breath.     Please follow up with Dr Woods 9/27 at 12:30pm        SECONDARY DISCHARGE DIAGNOSES  Diagnosis: Hyperuricemia  Assessment and Plan of Treatment: You were found to have sign of a UTI on urinanalysis. Your urine culture was negative.   Please continue to take your allopurinol   Please follow up with your PCP within 2 weeks.    Diagnosis: Hypertension  Assessment and Plan of Treatment: You have high blood pressure. Please take your amlodipine 5 daily as prescribed.

## 2022-09-23 NOTE — PROGRESS NOTE ADULT - ASSESSMENT
71 y/o female with PMHx mitral valve prolapse, visual vestibular mismatch w/ vertigo symptoms, palpitations, hyperuremia on allopurinol who presented to [facility] ED with chest pain after having abnormal NST with Dr. Woods. Patient has had 2 episodes of exertional angina in the last week Patient now admitted to cardiac Tele for r/out acs with plan for ischemic work up with cardiac cath

## 2022-09-23 NOTE — DISCHARGE NOTE PROVIDER - CARE PROVIDER_API CALL
Rubina Woods (MD)  Cardiovascular Disease  [address] [place] [address]  Phone: [phone]  Fax: [phone]  Established Patient  Follow Up Time: 1 week   Rubina Woods (MD)  Cardiovascular Disease  [address] [place] [address]  Phone: [phone]  Fax: [phone]  Established Patient  Scheduled Appointment: 09/27/2022 12:30 PM

## 2022-09-23 NOTE — DISCHARGE NOTE PROVIDER - NSDCCPTREATMENT_GEN_ALL_CORE_FT
PRINCIPAL PROCEDURE  Procedure: Complete transthoracic echocardiography (TTE) with contrast  Findings and Treatment: CHO TTE WO CON COMP W DOPP - 02806; - [id number]  Indication(s):      R22.2 - Localized swelling, mass and lump, trunk  Procedure:          A complete two-dimensional transthoracic   echocardiogram was                      performed: 2D, M-mode, spectral and color flow   Doppler.  Ordering Location:  [facility]  Contrast Injection: Contrast injection with an imaging solution Definity   was                      performed to enhance endocardial border definition.  ---------------------------------------------------------------------------  -----  CONCLUSIONS:   1. Normal left ventricular size and systolic function.   2. Normal right ventricular size and systolic function.   3. No significant valvular disease.   4. Trivial pericardial effusion.   5. No prior echo is available for comparison.

## 2022-09-23 NOTE — DISCHARGE NOTE PROVIDER - NSDCMRMEDTOKEN_GEN_ALL_CORE_FT
allopurinol: 150 milligram(s) orally once a day  aspirin 81 mg oral delayed release tablet: 1 tab(s) orally 3 times a week  propranolol 20 mg oral tablet: 1 tab(s) orally 2 times a day  ticagrelor 90 mg oral tablet: 1 tab(s) orally every 12 hours   allopurinol: 150 milligram(s) orally once a day  aspirin 81 mg oral delayed release tablet: 1 tab(s) orally 3 times a week  pantoprazole 40 mg oral delayed release tablet: 1 tab(s) orally once a day (before a meal)  propranolol 20 mg oral tablet: 1 tab(s) orally 2 times a day   allopurinol: 150 milligram(s) orally once a day  amLODIPine 5 mg oral tablet: 1 tab(s) orally once a day  aspirin 81 mg oral delayed release tablet: 1 tab(s) orally 3 times a week  atorvastatin 80 mg oral tablet: 1 tab(s) orally once a day (at bedtime)  Macrobid 100 mg oral capsule: 1 cap(s) orally every 12 hours   pantoprazole 40 mg oral delayed release tablet: 1 tab(s) orally once a day (before a meal)  propranolol 20 mg oral tablet: 1 tab(s) orally 2 times a day   allopurinol: 150 milligram(s) orally once a day  amLODIPine 5 mg oral tablet: 1 tab(s) orally once a day  aspirin 81 mg oral delayed release tablet: 1 tab(s) orally 3 times a week  atorvastatin 80 mg oral tablet: 1 tab(s) orally once a day (at bedtime)  Macrobid 100 mg oral capsule: 1 cap(s) orally every 12 hours   pantoprazole 40 mg oral delayed release tablet: 1 tab(s) orally once a day   propranolol 20 mg oral tablet: 1 tab(s) orally 2 times a day   allopurinol: 150 milligram(s) orally once a day  amLODIPine 5 mg oral tablet: 1 tab(s) orally once a day  aspirin 81 mg oral delayed release tablet: 1 tab(s) orally 3 times a week  atorvastatin 80 mg oral tablet: 1 tab(s) orally once a day (at bedtime)  pantoprazole 40 mg oral delayed release tablet: 1 tab(s) orally once a day   propranolol 20 mg oral tablet: 1 tab(s) orally 2 times a day

## 2022-09-23 NOTE — CHART NOTE - NSCHARTNOTEFT_GEN_A_CORE
Precath Checklist      Allergies:  No Known Allergies      Shellfish/Contrast Allergies?  No      Pulses:  Carotid 2+ b/l no bruits  Radial 2+ b/l  Femoral 2+ b/l no bruits  DP 2+ b/l  PT 2+ b/l                                13.3   8.70  )-----------( 279      ( 23 Sep 2022 05:30 )             39.7     09-23    144  |  107  |  11  ----------------------------<  122<H>  3.9   |  21<L>  |  0.71    Ca    9.1      23 Sep 2022 05:30  Mg     2.1     09-23    TPro  6.7  /  Alb  4.1  /  TBili  0.7  /  DBili  x   /  AST  26  /  ALT  14  /  AlkPhos  114  09-23    CARDIAC MARKERS ( 23 Sep 2022 05:30 )  x     / 0.03 ng/mL / x     / x     / x      CARDIAC MARKERS ( 23 Sep 2022 01:12 )  x     / 0.03 ng/mL / 47 U/L / x     / 2.3 ng/mL  CARDIAC MARKERS ( 22 Sep 2022 19:41 )  x     / 0.03 ng/mL / 44 U/L / x     / 2.1 ng/mL          COVID Result within 48-72hours:   COVID-19 PCR: Negative (09-22-22 @ 19:41)        EKG: NSR TWI I, AVL V1-V2	    ASA I        Mallampati class: II      Anginal Class: II      Sedation Plan:   [x  ] None   [  ] Moderate   [  ]  Deep    [  ]  General Anesthesia  -- pt with prior reactions (N/V) with all sedation and would like to try local  Patient Is Suitable Candidate For Sedation?     [x  ] Yes   [  ] No   [  ] Not Applicable   Cath Order Entered: [ x ] Yes  DAPT LOAD Ordered: [x  ] Yes  [  ] No load 2/2 ________  Pre-Cath fluids Ordered: [x  ] Yes  [  ] Not indicated 2/2 _________    Risks Benefits Annotation:     CARDIAC CATH: Risks & benefits of procedure and sedation and risks and benefits for the alternative therapy have been explained to the patient and/or HCP in layman’s terms including but not limited to: allergic reaction, bleeding, infection, arrhythmia, respiratory compromise, renal and vascular compromise, limb damage, MI, CVA, emergent CABG/Vascular Surgery and death. Informed consent obtained and in chart.

## 2022-09-23 NOTE — PROGRESS NOTE ADULT - PROBLEM SELECTOR PLAN 1
currently w/ band like CP  - Trop 0.03-> f/u 1 AM trop  - EKG: NSR TWI I, AVL V1-V2  - s/p ASA 81x 4 in Dr. Woods office, loaded with Brilinta 180mg in ED, Held AM dose, would consider switching to Plavix for reduced bleeding risk, patient has vestibular dysfunction/vertigo w/ unsteady gait and risk for falls,   - continue Atorvastatin 80mg and propranolol switched to metoprolol 25mg BID  - NPO for cardiac cath   - obtain collateral from Dr. Woods office- Echo, stress test currently w/ band like CP. Denies SOB  - Trop 0.03-> f/u 1 AM trop  - EKG: NSR TWI I, AVL V1-V2      - s/p ASA 81x 4 in Dr. Woods office, loaded with Brilinta 180mg in ED, Held AM dose, would consider switching to Plavix for reduced bleeding risk, patient has vestibular dysfunction/vertigo w/ unsteady gait and risk for falls,   - continue Atorvastatin 80mg   - propranolol switched to metoprolol 25mg BID  - NPO for cardiac cath   - if YUAN placed, Brillinta too expensive for patient, will consult interventional about clinical correlate to Plavix

## 2022-09-23 NOTE — PROGRESS NOTE ADULT - PROBLEM SELECTOR PLAN 2
pt w/ Hx of UTI symptoms in the setting of hyperurecemia in urine, improved on allopurinol  -continue allopurinol 150mg qd    Dispo pending cath

## 2022-09-24 ENCOUNTER — TRANSCRIPTION ENCOUNTER (OUTPATIENT)
Age: 72
End: 2022-09-24

## 2022-09-24 VITALS — TEMPERATURE: 99 F

## 2022-09-24 LAB
ANION GAP SERPL CALC-SCNC: 12 MMOL/L — SIGNIFICANT CHANGE UP (ref 5–17)
BUN SERPL-MCNC: 10 MG/DL — SIGNIFICANT CHANGE UP (ref 7–23)
CALCIUM SERPL-MCNC: 9.1 MG/DL — SIGNIFICANT CHANGE UP (ref 8.4–10.5)
CHLORIDE SERPL-SCNC: 108 MMOL/L — SIGNIFICANT CHANGE UP (ref 96–108)
CO2 SERPL-SCNC: 21 MMOL/L — LOW (ref 22–31)
CREAT SERPL-MCNC: 0.69 MG/DL — SIGNIFICANT CHANGE UP (ref 0.5–1.3)
CULTURE RESULTS: SIGNIFICANT CHANGE UP
EGFR: 92 ML/MIN/1.73M2 — SIGNIFICANT CHANGE UP
GLUCOSE SERPL-MCNC: 105 MG/DL — HIGH (ref 70–99)
HCT VFR BLD CALC: 39.7 % — SIGNIFICANT CHANGE UP (ref 34.5–45)
HGB BLD-MCNC: 13.1 G/DL — SIGNIFICANT CHANGE UP (ref 11.5–15.5)
MAGNESIUM SERPL-MCNC: 2 MG/DL — SIGNIFICANT CHANGE UP (ref 1.6–2.6)
MCHC RBC-ENTMCNC: 31 PG — SIGNIFICANT CHANGE UP (ref 27–34)
MCHC RBC-ENTMCNC: 33 GM/DL — SIGNIFICANT CHANGE UP (ref 32–36)
MCV RBC AUTO: 93.9 FL — SIGNIFICANT CHANGE UP (ref 80–100)
NRBC # BLD: 0 /100 WBCS — SIGNIFICANT CHANGE UP (ref 0–0)
PLATELET # BLD AUTO: 277 K/UL — SIGNIFICANT CHANGE UP (ref 150–400)
POTASSIUM SERPL-MCNC: 4.2 MMOL/L — SIGNIFICANT CHANGE UP (ref 3.5–5.3)
POTASSIUM SERPL-SCNC: 4.2 MMOL/L — SIGNIFICANT CHANGE UP (ref 3.5–5.3)
RBC # BLD: 4.23 M/UL — SIGNIFICANT CHANGE UP (ref 3.8–5.2)
RBC # FLD: 13.6 % — SIGNIFICANT CHANGE UP (ref 10.3–14.5)
SODIUM SERPL-SCNC: 141 MMOL/L — SIGNIFICANT CHANGE UP (ref 135–145)
SPECIMEN SOURCE: SIGNIFICANT CHANGE UP
WBC # BLD: 8.63 K/UL — SIGNIFICANT CHANGE UP (ref 3.8–10.5)
WBC # FLD AUTO: 8.63 K/UL — SIGNIFICANT CHANGE UP (ref 3.8–10.5)

## 2022-09-24 PROCEDURE — 80061 LIPID PANEL: CPT

## 2022-09-24 PROCEDURE — 71045 X-RAY EXAM CHEST 1 VIEW: CPT

## 2022-09-24 PROCEDURE — 36415 COLL VENOUS BLD VENIPUNCTURE: CPT

## 2022-09-24 PROCEDURE — 93306 TTE W/DOPPLER COMPLETE: CPT | Mod: 26

## 2022-09-24 PROCEDURE — C1894: CPT

## 2022-09-24 PROCEDURE — 36000 PLACE NEEDLE IN VEIN: CPT

## 2022-09-24 PROCEDURE — C1769: CPT

## 2022-09-24 PROCEDURE — 97161 PT EVAL LOW COMPLEX 20 MIN: CPT

## 2022-09-24 PROCEDURE — 81001 URINALYSIS AUTO W/SCOPE: CPT

## 2022-09-24 PROCEDURE — 87086 URINE CULTURE/COLONY COUNT: CPT

## 2022-09-24 PROCEDURE — 83880 ASSAY OF NATRIURETIC PEPTIDE: CPT

## 2022-09-24 PROCEDURE — 82962 GLUCOSE BLOOD TEST: CPT

## 2022-09-24 PROCEDURE — 80053 COMPREHEN METABOLIC PANEL: CPT

## 2022-09-24 PROCEDURE — C1887: CPT

## 2022-09-24 PROCEDURE — 84484 ASSAY OF TROPONIN QUANT: CPT

## 2022-09-24 PROCEDURE — 84443 ASSAY THYROID STIM HORMONE: CPT

## 2022-09-24 PROCEDURE — 85025 COMPLETE CBC W/AUTO DIFF WBC: CPT

## 2022-09-24 PROCEDURE — 99285 EMERGENCY DEPT VISIT HI MDM: CPT

## 2022-09-24 PROCEDURE — 82553 CREATINE MB FRACTION: CPT

## 2022-09-24 PROCEDURE — 99238 HOSP IP/OBS DSCHRG MGMT 30/<: CPT

## 2022-09-24 PROCEDURE — C8929: CPT

## 2022-09-24 PROCEDURE — 82550 ASSAY OF CK (CPK): CPT

## 2022-09-24 PROCEDURE — 87635 SARS-COV-2 COVID-19 AMP PRB: CPT

## 2022-09-24 PROCEDURE — 84436 ASSAY OF TOTAL THYROXINE: CPT

## 2022-09-24 PROCEDURE — 86803 HEPATITIS C AB TEST: CPT

## 2022-09-24 PROCEDURE — 80048 BASIC METABOLIC PNL TOTAL CA: CPT

## 2022-09-24 PROCEDURE — 85730 THROMBOPLASTIN TIME PARTIAL: CPT

## 2022-09-24 PROCEDURE — 85027 COMPLETE CBC AUTOMATED: CPT

## 2022-09-24 PROCEDURE — 83735 ASSAY OF MAGNESIUM: CPT

## 2022-09-24 PROCEDURE — 85610 PROTHROMBIN TIME: CPT

## 2022-09-24 RX ORDER — PROPRANOLOL HCL 160 MG
1 CAPSULE, EXTENDED RELEASE 24HR ORAL
Qty: 60 | Refills: 0
Start: 2022-09-24 | End: 2022-10-23

## 2022-09-24 RX ORDER — PROPRANOLOL HCL 160 MG
1 CAPSULE, EXTENDED RELEASE 24HR ORAL
Qty: 0 | Refills: 0 | DISCHARGE

## 2022-09-24 RX ORDER — NITROFURANTOIN MACROCRYSTAL 50 MG
1 CAPSULE ORAL
Qty: 14 | Refills: 0
Start: 2022-09-24 | End: 2022-09-30

## 2022-09-24 RX ORDER — AMLODIPINE BESYLATE 2.5 MG/1
1 TABLET ORAL
Qty: 0 | Refills: 0 | DISCHARGE

## 2022-09-24 RX ORDER — ATORVASTATIN CALCIUM 80 MG/1
1 TABLET, FILM COATED ORAL
Qty: 30 | Refills: 3
Start: 2022-09-24 | End: 2023-01-21

## 2022-09-24 RX ORDER — PANTOPRAZOLE SODIUM 20 MG/1
1 TABLET, DELAYED RELEASE ORAL
Qty: 0 | Refills: 0 | DISCHARGE
Start: 2022-09-24

## 2022-09-24 RX ORDER — PROPRANOLOL HCL 160 MG
1 CAPSULE, EXTENDED RELEASE 24HR ORAL
Qty: 60 | Refills: 3
Start: 2022-09-24 | End: 2023-01-21

## 2022-09-24 RX ORDER — PANTOPRAZOLE SODIUM 20 MG/1
1 TABLET, DELAYED RELEASE ORAL
Qty: 30 | Refills: 0
Start: 2022-09-24 | End: 2022-10-23

## 2022-09-24 RX ADMIN — Medication 81 MILLIGRAM(S): at 12:32

## 2022-09-24 RX ADMIN — Medication 150 MILLIGRAM(S): at 12:33

## 2022-09-24 RX ADMIN — PANTOPRAZOLE SODIUM 40 MILLIGRAM(S): 20 TABLET, DELAYED RELEASE ORAL at 05:47

## 2022-09-24 NOTE — DISCHARGE NOTE NURSING/CASE MANAGEMENT/SOCIAL WORK - NSDCPETBCESMAN_GEN_ALL_CORE
If you are a smoker, it is important for your health to stop smoking. Please be aware that second hand smoke is also harmful. 18

## 2022-09-24 NOTE — PHYSICAL THERAPY INITIAL EVALUATION ADULT - PERTINENT HX OF CURRENT PROBLEM, REHAB EVAL
Pt. is a 72 y.o female presenting from out-patient stress test with CP and s/p near-syncopal episode. Pt. has been admitted for further cardiac w/u/cardiac CATH. Pt. now s/p Cath on 9/23.

## 2022-09-24 NOTE — DISCHARGE NOTE NURSING/CASE MANAGEMENT/SOCIAL WORK - PATIENT PORTAL LINK FT
You can access the FollowMyHealth Patient Portal offered by [facility] by registering at the following website: [email or web address]. By joining [employer]’s FollowMyHealth portal, you will also be able to view your health information using other applications (apps) compatible with our system.

## 2022-09-24 NOTE — DISCHARGE NOTE NURSING/CASE MANAGEMENT/SOCIAL WORK - NSDCPEFALRISK_GEN_ALL_CORE
For information on Fall & Injury Prevention, visit: [email or web address] OR  [email or web address] OR  https://www.cdc.gov/steadi/patient.html

## 2022-09-30 DIAGNOSIS — E79.0 HYPERURICEMIA WITHOUT SIGNS OF INFLAMMATORY ARTHRITIS AND TOPHACEOUS DISEASE: ICD-10-CM

## 2022-09-30 DIAGNOSIS — I10 ESSENTIAL (PRIMARY) HYPERTENSION: ICD-10-CM

## 2022-09-30 DIAGNOSIS — H81.20 VESTIBULAR NEURONITIS, UNSPECIFIED EAR: ICD-10-CM

## 2022-09-30 DIAGNOSIS — R94.39 ABNORMAL RESULT OF OTHER CARDIOVASCULAR FUNCTION STUDY: ICD-10-CM

## 2022-09-30 DIAGNOSIS — I25.3 ANEURYSM OF HEART: ICD-10-CM

## 2022-09-30 DIAGNOSIS — I34.1 NONRHEUMATIC MITRAL (VALVE) PROLAPSE: ICD-10-CM

## 2022-09-30 DIAGNOSIS — Z79.82 LONG TERM (CURRENT) USE OF ASPIRIN: ICD-10-CM

## 2022-09-30 DIAGNOSIS — Z90.721 ACQUIRED ABSENCE OF OVARIES, UNILATERAL: ICD-10-CM

## 2022-10-28 PROBLEM — R00.2 PALPITATIONS: Chronic | Status: ACTIVE | Noted: 2022-09-22

## 2022-10-28 PROBLEM — Z86.79 PERSONAL HISTORY OF OTHER DISEASES OF THE CIRCULATORY SYSTEM: Chronic | Status: ACTIVE | Noted: 2022-09-22

## 2022-10-28 PROBLEM — H81.90 UNSPECIFIED DISORDER OF VESTIBULAR FUNCTION, UNSPECIFIED EAR: Chronic | Status: ACTIVE | Noted: 2022-09-22

## 2022-10-31 ENCOUNTER — APPOINTMENT (OUTPATIENT)
Dept: CARDIOLOGY | Facility: CLINIC | Age: 72
End: 2022-10-31

## 2022-10-31 VITALS
SYSTOLIC BLOOD PRESSURE: 129 MMHG | TEMPERATURE: 97.7 F | WEIGHT: 155 LBS | HEIGHT: 67 IN | DIASTOLIC BLOOD PRESSURE: 71 MMHG | HEART RATE: 74 BPM | BODY MASS INDEX: 24.33 KG/M2

## 2022-10-31 DIAGNOSIS — G43.909 MIGRAINE, UNSPECIFIED, NOT INTRACTABLE, W/OUT STATUS MIGRAINOSUS: ICD-10-CM

## 2022-10-31 DIAGNOSIS — Z82.0 FAMILY HISTORY OF EPILEPSY AND OTHER DISEASES OF THE NERVOUS SYSTEM: ICD-10-CM

## 2022-10-31 DIAGNOSIS — I10 ESSENTIAL (PRIMARY) HYPERTENSION: ICD-10-CM

## 2022-10-31 DIAGNOSIS — Z82.49 FAMILY HISTORY OF ISCHEMIC HEART DISEASE AND OTHER DISEASES OF THE CIRCULATORY SYSTEM: ICD-10-CM

## 2022-10-31 DIAGNOSIS — Z78.9 OTHER SPECIFIED HEALTH STATUS: ICD-10-CM

## 2022-10-31 DIAGNOSIS — I47.29 OTHER VENTRICULAR TACHYCARDIA: ICD-10-CM

## 2022-10-31 DIAGNOSIS — I34.1 NONRHEUMATIC MITRAL (VALVE) PROLAPSE: ICD-10-CM

## 2022-10-31 DIAGNOSIS — I25.10 ATHEROSCLEROTIC HEART DISEASE OF NATIVE CORONARY ARTERY W/OUT ANGINA PECTORIS: ICD-10-CM

## 2022-10-31 DIAGNOSIS — H81.10 BENIGN PAROXYSMAL VERTIGO, UNSPECIFIED EAR: ICD-10-CM

## 2022-10-31 DIAGNOSIS — I47.1 SUPRAVENTRICULAR TACHYCARDIA: ICD-10-CM

## 2022-10-31 PROBLEM — Z00.00 ENCOUNTER FOR PREVENTIVE HEALTH EXAMINATION: Status: ACTIVE | Noted: 2022-10-31

## 2022-10-31 PROCEDURE — 93000 ELECTROCARDIOGRAM COMPLETE: CPT

## 2022-10-31 PROCEDURE — 99214 OFFICE O/P EST MOD 30 MIN: CPT

## 2022-10-31 RX ORDER — PROPRANOLOL HYDROCHLORIDE 20 MG/1
20 TABLET ORAL
Refills: 0 | Status: ACTIVE | COMMUNITY

## 2022-10-31 RX ORDER — ASPIRIN ENTERIC COATED TABLETS 81 MG 81 MG/1
81 TABLET, DELAYED RELEASE ORAL DAILY
Refills: 0 | Status: ACTIVE | COMMUNITY

## 2022-10-31 RX ORDER — PANTOPRAZOLE 40 MG/1
40 TABLET, DELAYED RELEASE ORAL DAILY
Refills: 0 | Status: ACTIVE | COMMUNITY

## 2022-10-31 RX ORDER — AMLODIPINE BESYLATE 5 MG/1
5 TABLET ORAL DAILY
Refills: 0 | Status: ACTIVE | COMMUNITY

## 2022-10-31 RX ORDER — ATORVASTATIN CALCIUM 40 MG/1
40 TABLET, FILM COATED ORAL
Refills: 0 | Status: ACTIVE | COMMUNITY

## 2022-10-31 RX ORDER — ALLOPURINOL 300 MG/1
300 TABLET ORAL DAILY
Refills: 0 | Status: ACTIVE | COMMUNITY

## 2022-10-31 NOTE — DISCUSSION/SUMMARY
[EKG obtained to assist in diagnosis and management of assessed problem(s)] : EKG obtained to assist in diagnosis and management of assessed problem(s) [FreeTextEntry1] : Ms. Sara Gill is a pleasant 72 year old woman with hypertension, mild coronary atherosclerosis, mild mitral valve prolapse, non-sustained supraventricular tachycardia, referred for evaluation of palpitations. \par \par Patient has no history of syncope and no family history of sudden cardiac death.\par \par Patient has no significant coronary disease on cardiac cath and normal MRI with no LGE and no LV aneurysm. \par \par I recommend to continue treatment for palpitations, NSVT, and non-sustained supraventricular tachycardia with beta blockers. I recommend to titrate beta blockers to blood pressure and heart rate. Patient can have repeated MCOT or extended Holter after the Propranolol dose is maxed out. \par \par I discussed with patient plan of care in great details. I answered all her questions to her satisfaction. Patient was pleased with the visit.\par \par Patient will follow with me in 4-6 months’ time. Please do not hesitate to contact me at [phone] if you have any further questions regarding this patient care.

## 2022-10-31 NOTE — ADDENDUM
[FreeTextEntry1] : Kristine MARTIN assisted in documentation on 10/31/2022   acting as a scribe for Dr. Robbi Chaney\par

## 2022-10-31 NOTE — CARDIOLOGY SUMMARY
[de-identified] : (10/31/2022) sinus rhythm at 74 bpm, no specific T wave abnormalities.  [de-identified] : (09/27/2022-10/26/2022) 29 days MCOT, NSVT 5 beats, at 120 bpm, non-sustained supraventricular tachycardia 10 episodes, longest 16 beats at 130 bpm. Low PVC burden 0.05%. Low APC burden 0.03%. No sustained arrhythmias  [de-identified] : (09/24/2022) 2D echo, normal EF, no LV aneurysm (echo done at [facility]).  [de-identified] : (10/06/2022) cardiac MRI normal sized chambers, presence of pectus excavatum with mild mass effect on the anterior artery, no LVH, no scar, no infiltration, no inflammation, LVEF 70%, RVEF 65%, mild anterior leaflet mitral valve prolapse, trace of mild MR.\par  [de-identified] : (08/06/2022) 2D echo EF 55-58%, mild to moderate mitral valve prolapse, mild to moderate mitral regurg.

## 2022-10-31 NOTE — HISTORY OF PRESENT ILLNESS
[FreeTextEntry1] : Coronary atherosclerosis, mild mitral valve prolapse, questionable LV aneurysm ruled out by cardiac  MRI and 2D echo, history of ocular migraine, non-sustained supraventricular tachycardia, NSVT, presenting for evaluation of palpitations.\par \par Patient reports mild intermittent palpitations, brief, lasting a few seconds, no change in pattern in recent time. \par \par  She has an extensive workup with Dr. Woods, including cardiac cath, cardiac MRI, Holter monitor, and echo. \par \par Patient has no chest pain, no shortness of breath, no dizziness, no lightheadedness, and no syncope. \par \par She presents for evaluation.

## 2023-10-04 NOTE — PATIENT PROFILE ADULT - HOME ACCESSIBILITY CONCERNS
none Tranexamic Acid Counseling:  Patient advised of the small risk of bleeding problems with tranexamic acid. They were also instructed to call if they developed any nausea, vomiting or diarrhea. All of the patient's questions and concerns were addressed.

## 2023-10-26 ENCOUNTER — TRANSCRIPTION ENCOUNTER (OUTPATIENT)
Age: 73
End: 2023-10-26